# Patient Record
Sex: MALE | Race: BLACK OR AFRICAN AMERICAN | NOT HISPANIC OR LATINO | Employment: FULL TIME | URBAN - METROPOLITAN AREA
[De-identification: names, ages, dates, MRNs, and addresses within clinical notes are randomized per-mention and may not be internally consistent; named-entity substitution may affect disease eponyms.]

---

## 2024-11-20 ENCOUNTER — HOSPITAL ENCOUNTER (OUTPATIENT)
Facility: HOSPITAL | Age: 54
Discharge: HOME | End: 2024-11-22
Attending: EMERGENCY MEDICINE | Admitting: EMERGENCY MEDICINE
Payer: OTHER GOVERNMENT

## 2024-11-20 DIAGNOSIS — R07.9 CHEST PAIN: ICD-10-CM

## 2024-11-20 DIAGNOSIS — R79.89 TROPONIN I ABOVE REFERENCE RANGE: ICD-10-CM

## 2024-11-20 DIAGNOSIS — I50.42 CHRONIC COMBINED SYSTOLIC AND DIASTOLIC HEART FAILURE: Primary | ICD-10-CM

## 2024-11-20 DIAGNOSIS — R55 SYNCOPE: ICD-10-CM

## 2024-11-20 LAB
ALBUMIN SERPL BCP-MCNC: 4.1 G/DL (ref 3.5–5.2)
ALP SERPL-CCNC: 71 U/L (ref 40–150)
ALT SERPL W/O P-5'-P-CCNC: 113 U/L (ref 10–44)
ANION GAP SERPL CALC-SCNC: 17 MMOL/L (ref 8–16)
AST SERPL-CCNC: 145 U/L (ref 10–40)
BASOPHILS # BLD AUTO: 0.04 K/UL (ref 0–0.2)
BASOPHILS NFR BLD: 0.5 % (ref 0–1.9)
BILIRUB SERPL-MCNC: 1.3 MG/DL (ref 0.1–1)
BILIRUB UR QL STRIP: NEGATIVE
BNP SERPL-MCNC: 50 PG/ML (ref 0–99)
BUN SERPL-MCNC: 32 MG/DL (ref 6–20)
CALCIUM SERPL-MCNC: 9.5 MG/DL (ref 8.7–10.5)
CHLORIDE SERPL-SCNC: 87 MMOL/L (ref 95–110)
CK SERPL-CCNC: 352 U/L (ref 20–200)
CK SERPL-CCNC: 352 U/L (ref 20–200)
CLARITY UR REFRACT.AUTO: CLEAR
CO2 SERPL-SCNC: 26 MMOL/L (ref 23–29)
COLOR UR AUTO: YELLOW
CREAT SERPL-MCNC: 3 MG/DL (ref 0.5–1.4)
CREAT UR-MCNC: 109 MG/DL (ref 23–375)
D DIMER PPP IA.FEU-MCNC: 1.48 MG/L FEU
DIFFERENTIAL METHOD BLD: ABNORMAL
EOSINOPHIL # BLD AUTO: 0 K/UL (ref 0–0.5)
EOSINOPHIL NFR BLD: 0.1 % (ref 0–8)
ERYTHROCYTE [DISTWIDTH] IN BLOOD BY AUTOMATED COUNT: 16.2 % (ref 11.5–14.5)
EST. GFR  (NO RACE VARIABLE): 23.6 ML/MIN/1.73 M^2
GLUCOSE SERPL-MCNC: 88 MG/DL (ref 70–110)
GLUCOSE UR QL STRIP: NEGATIVE
HCT VFR BLD AUTO: 46.9 % (ref 40–54)
HGB BLD-MCNC: 16.6 G/DL (ref 14–18)
HGB UR QL STRIP: ABNORMAL
IMM GRANULOCYTES # BLD AUTO: 0.06 K/UL (ref 0–0.04)
IMM GRANULOCYTES NFR BLD AUTO: 0.8 % (ref 0–0.5)
INR PPP: 0.9 (ref 0.8–1.2)
KETONES UR QL STRIP: NEGATIVE
LEUKOCYTE ESTERASE UR QL STRIP: NEGATIVE
LYMPHOCYTES # BLD AUTO: 1.1 K/UL (ref 1–4.8)
LYMPHOCYTES NFR BLD: 14.5 % (ref 18–48)
MAGNESIUM SERPL-MCNC: 1.7 MG/DL (ref 1.6–2.6)
MCH RBC QN AUTO: 30.6 PG (ref 27–31)
MCHC RBC AUTO-ENTMCNC: 35.4 G/DL (ref 32–36)
MCV RBC AUTO: 86 FL (ref 82–98)
MONOCYTES # BLD AUTO: 1.1 K/UL (ref 0.3–1)
MONOCYTES NFR BLD: 15.6 % (ref 4–15)
NEUTROPHILS # BLD AUTO: 5 K/UL (ref 1.8–7.7)
NEUTROPHILS NFR BLD: 68.5 % (ref 38–73)
NITRITE UR QL STRIP: NEGATIVE
NRBC BLD-RTO: 0 /100 WBC
OHS QRS DURATION: 88 MS
OHS QTC CALCULATION: 460 MS
PH UR STRIP: 5 [PH] (ref 5–8)
PHOSPHATE SERPL-MCNC: 3.7 MG/DL (ref 2.7–4.5)
PLATELET # BLD AUTO: 148 K/UL (ref 150–450)
PMV BLD AUTO: 11.7 FL (ref 9.2–12.9)
POTASSIUM SERPL-SCNC: 3.8 MMOL/L (ref 3.5–5.1)
PROT SERPL-MCNC: 8.6 G/DL (ref 6–8.4)
PROT UR QL STRIP: NEGATIVE
PROTHROMBIN TIME: 10 SEC (ref 9–12.5)
RBC # BLD AUTO: 5.43 M/UL (ref 4.6–6.2)
SODIUM SERPL-SCNC: 130 MMOL/L (ref 136–145)
SODIUM UR-SCNC: 10 MMOL/L (ref 20–250)
SP GR UR STRIP: 1.01 (ref 1–1.03)
TROPONIN I SERPL DL<=0.01 NG/ML-MCNC: 0.1 NG/ML (ref 0–0.03)
TROPONIN I SERPL DL<=0.01 NG/ML-MCNC: 0.11 NG/ML (ref 0–0.03)
TROPONIN I SERPL DL<=0.01 NG/ML-MCNC: 0.11 NG/ML (ref 0–0.03)
TSH SERPL DL<=0.005 MIU/L-ACNC: 2.4 UIU/ML (ref 0.4–4)
URATE SERPL-MCNC: 7.3 MG/DL (ref 3.4–7)
URN SPEC COLLECT METH UR: ABNORMAL
WBC # BLD AUTO: 7.32 K/UL (ref 3.9–12.7)

## 2024-11-20 PROCEDURE — 84484 ASSAY OF TROPONIN QUANT: CPT | Mod: 91

## 2024-11-20 PROCEDURE — 83735 ASSAY OF MAGNESIUM: CPT | Performed by: EMERGENCY MEDICINE

## 2024-11-20 PROCEDURE — 82550 ASSAY OF CK (CPK): CPT | Performed by: EMERGENCY MEDICINE

## 2024-11-20 PROCEDURE — 80053 COMPREHEN METABOLIC PANEL: CPT | Performed by: EMERGENCY MEDICINE

## 2024-11-20 PROCEDURE — 84443 ASSAY THYROID STIM HORMONE: CPT | Performed by: EMERGENCY MEDICINE

## 2024-11-20 PROCEDURE — 85379 FIBRIN DEGRADATION QUANT: CPT | Performed by: EMERGENCY MEDICINE

## 2024-11-20 PROCEDURE — 80307 DRUG TEST PRSMV CHEM ANLYZR: CPT

## 2024-11-20 PROCEDURE — 84300 ASSAY OF URINE SODIUM: CPT

## 2024-11-20 PROCEDURE — 81003 URINALYSIS AUTO W/O SCOPE: CPT | Mod: 59

## 2024-11-20 PROCEDURE — 99285 EMERGENCY DEPT VISIT HI MDM: CPT | Mod: 25

## 2024-11-20 PROCEDURE — 84550 ASSAY OF BLOOD/URIC ACID: CPT | Performed by: EMERGENCY MEDICINE

## 2024-11-20 PROCEDURE — 85025 COMPLETE CBC W/AUTO DIFF WBC: CPT | Performed by: EMERGENCY MEDICINE

## 2024-11-20 PROCEDURE — 93010 ELECTROCARDIOGRAM REPORT: CPT | Mod: ,,, | Performed by: INTERNAL MEDICINE

## 2024-11-20 PROCEDURE — 93005 ELECTROCARDIOGRAM TRACING: CPT

## 2024-11-20 PROCEDURE — 85610 PROTHROMBIN TIME: CPT | Performed by: EMERGENCY MEDICINE

## 2024-11-20 PROCEDURE — 25000003 PHARM REV CODE 250

## 2024-11-20 PROCEDURE — 84484 ASSAY OF TROPONIN QUANT: CPT | Mod: 91 | Performed by: EMERGENCY MEDICINE

## 2024-11-20 PROCEDURE — 84100 ASSAY OF PHOSPHORUS: CPT | Performed by: EMERGENCY MEDICINE

## 2024-11-20 PROCEDURE — 83880 ASSAY OF NATRIURETIC PEPTIDE: CPT | Performed by: EMERGENCY MEDICINE

## 2024-11-20 PROCEDURE — 96361 HYDRATE IV INFUSION ADD-ON: CPT

## 2024-11-20 RX ADMIN — SODIUM CHLORIDE 1000 ML: 9 INJECTION, SOLUTION INTRAVENOUS at 09:11

## 2024-11-20 NOTE — FIRST PROVIDER EVALUATION
"Medical screening examination initiated.  I have conducted a focused provider triage encounter, findings are as follows:    Brief history of present illness:  55 yo male presenting with leg cramping since Monday night. Then noted dizziness and shakiness.     Vitals:    11/20/24 1643   BP: 118/89   BP Location: Right arm   Pulse: (!) 118   Resp: 20   Temp: 97.9 °F (36.6 °C)   TempSrc: Oral   SpO2: 96%   Weight: 78 kg (172 lb)   Height: 5' 7" (1.702 m)       Pertinent physical exam:  Tachycardic, tremulous, anxious appearing    Brief workup plan:  Labs, IVF as needed. Eval urgently, concern for withdrawal vs other metabolic cause    Preliminary workup initiated; this workup will be continued and followed by the physician or advanced practice provider that is assigned to the patient when roomed.  "

## 2024-11-21 PROBLEM — I83.009 VENOUS STASIS ULCER WITH EDEMA OF LOWER LEG: Status: ACTIVE | Noted: 2024-11-21

## 2024-11-21 PROBLEM — I83.899 VENOUS STASIS ULCER WITH EDEMA OF LOWER LEG: Status: ACTIVE | Noted: 2024-11-21

## 2024-11-21 PROBLEM — I10 HYPERTENSION: Status: ACTIVE | Noted: 2020-12-20

## 2024-11-21 PROBLEM — Z23 ENCOUNTER FOR IMMUNIZATION: Status: ACTIVE | Noted: 2024-11-21

## 2024-11-21 PROBLEM — R60.9 VENOUS STASIS ULCER WITH EDEMA OF LOWER LEG: Status: ACTIVE | Noted: 2024-11-21

## 2024-11-21 PROBLEM — L97.909 VENOUS STASIS ULCER WITH EDEMA OF LOWER LEG: Status: ACTIVE | Noted: 2024-11-21

## 2024-11-21 PROBLEM — R55 SYNCOPE: Status: ACTIVE | Noted: 2024-11-21

## 2024-11-21 PROBLEM — N17.9 AKI (ACUTE KIDNEY INJURY): Status: ACTIVE | Noted: 2024-11-21

## 2024-11-21 PROBLEM — E80.6 HYPERBILIRUBINEMIA: Status: ACTIVE | Noted: 2024-11-21

## 2024-11-21 PROBLEM — I50.42 CHRONIC COMBINED SYSTOLIC AND DIASTOLIC HEART FAILURE: Status: ACTIVE | Noted: 2020-11-01

## 2024-11-21 PROBLEM — D69.6 THROMBOCYTOPENIA: Status: ACTIVE | Noted: 2024-11-21

## 2024-11-21 LAB
ALBUMIN SERPL BCP-MCNC: 3.3 G/DL (ref 3.5–5.2)
ALP SERPL-CCNC: 62 U/L (ref 40–150)
ALT SERPL W/O P-5'-P-CCNC: 80 U/L (ref 10–44)
AMPHET+METHAMPHET UR QL: NEGATIVE
ANION GAP SERPL CALC-SCNC: 11 MMOL/L (ref 8–16)
ANION GAP SERPL CALC-SCNC: 14 MMOL/L (ref 8–16)
ASCENDING AORTA: 3.1 CM
AST SERPL-CCNC: 85 U/L (ref 10–40)
AV AREA BY CONTINUOUS VTI: 3.2 CM2
AV INDEX (PROSTH): 0.74
AV LVOT MEAN GRADIENT: 1 MMHG
AV LVOT PEAK GRADIENT: 2 MMHG
AV MEAN GRADIENT: 1.8 MMHG
AV PEAK GRADIENT: 3.2 MMHG
AV VALVE AREA BY VELOCITY RATIO: 3.2 CM²
AV VALVE AREA: 3.1 CM2
AV VELOCITY RATIO: 0.78
BARBITURATES UR QL SCN>200 NG/ML: NEGATIVE
BASOPHILS # BLD AUTO: 0.02 K/UL (ref 0–0.2)
BASOPHILS NFR BLD: 0.4 % (ref 0–1.9)
BENZODIAZ UR QL SCN>200 NG/ML: NEGATIVE
BILIRUB DIRECT SERPL-MCNC: 0.6 MG/DL (ref 0.1–0.3)
BILIRUB SERPL-MCNC: 1.5 MG/DL (ref 0.1–1)
BSA FOR ECHO PROCEDURE: 1.9 M2
BUN SERPL-MCNC: 27 MG/DL (ref 6–20)
BUN SERPL-MCNC: 31 MG/DL (ref 6–20)
BZE UR QL SCN: NEGATIVE
CALCIUM SERPL-MCNC: 8.4 MG/DL (ref 8.7–10.5)
CALCIUM SERPL-MCNC: 8.7 MG/DL (ref 8.7–10.5)
CANNABINOIDS UR QL SCN: NEGATIVE
CHLORIDE SERPL-SCNC: 96 MMOL/L (ref 95–110)
CHLORIDE SERPL-SCNC: 98 MMOL/L (ref 95–110)
CO2 SERPL-SCNC: 23 MMOL/L (ref 23–29)
CO2 SERPL-SCNC: 28 MMOL/L (ref 23–29)
CREAT SERPL-MCNC: 1.7 MG/DL (ref 0.5–1.4)
CREAT SERPL-MCNC: 1.9 MG/DL (ref 0.5–1.4)
CREAT UR-MCNC: 109 MG/DL (ref 23–375)
CV ECHO LV RWT: 0.44 CM
DIFFERENTIAL METHOD BLD: ABNORMAL
DOP CALC AO PEAK VEL: 0.9 M/S
DOP CALC AO VTI: 16.9 CM
DOP CALC LVOT AREA: 4.2 CM2
DOP CALC LVOT DIAMETER: 2.3 CM
DOP CALC LVOT PEAK VEL: 0.7 M/S
DOP CALC LVOT STROKE VOLUME: 51.9 CM3
DOP CALCLVOT PEAK VEL VTI: 12.5 CM
E WAVE DECELERATION TIME: 243.43 MS
E/A RATIO: 0.48
ECHO EF ESTIMATED: 54 %
ECHO LV POSTERIOR WALL: 1 CM (ref 0.6–1.1)
EOSINOPHIL # BLD AUTO: 0 K/UL (ref 0–0.5)
EOSINOPHIL NFR BLD: 0.2 % (ref 0–8)
ERYTHROCYTE [DISTWIDTH] IN BLOOD BY AUTOMATED COUNT: 16.4 % (ref 11.5–14.5)
EST. GFR  (NO RACE VARIABLE): 40.9 ML/MIN/1.73 M^2
EST. GFR  (NO RACE VARIABLE): 46.7 ML/MIN/1.73 M^2
ETHANOL SERPL-MCNC: <10 MG/DL
FRACTIONAL SHORTENING: 26.7 % (ref 28–44)
GLOBAL LONGITUIDAL STRAIN: 13 %
GLUCOSE SERPL-MCNC: 122 MG/DL (ref 70–110)
GLUCOSE SERPL-MCNC: 99 MG/DL (ref 70–110)
HAPTOGLOB SERPL-MCNC: 138 MG/DL (ref 30–250)
HAV IGM SERPL QL IA: NORMAL
HBV CORE IGM SERPL QL IA: NORMAL
HBV SURFACE AG SERPL QL IA: NORMAL
HCT VFR BLD AUTO: 42.5 % (ref 40–54)
HCV AB SERPL QL IA: NORMAL
HGB BLD-MCNC: 14.5 G/DL (ref 14–18)
HIV 1+2 AB+HIV1 P24 AG SERPL QL IA: NORMAL
IMM GRANULOCYTES # BLD AUTO: 0.04 K/UL (ref 0–0.04)
IMM GRANULOCYTES NFR BLD AUTO: 0.8 % (ref 0–0.5)
INTERVENTRICULAR SEPTUM: 0.9 CM (ref 0.6–1.1)
IVC DIAMETER: 1.21 CM
LA MAJOR: 5.37 CM
LA MINOR: 5.44 CM
LA WIDTH: 3.55 CM
LDH SERPL L TO P-CCNC: 247 U/L (ref 110–260)
LEFT ATRIUM SIZE: 3.58 CM
LEFT ATRIUM VOLUME INDEX MOD: 25.7 ML/M2
LEFT ATRIUM VOLUME INDEX: 31.1 ML/M2
LEFT ATRIUM VOLUME MOD: 48.3 ML
LEFT ATRIUM VOLUME: 58.39 CM3
LEFT INTERNAL DIMENSION IN SYSTOLE: 3.3 CM (ref 2.1–4)
LEFT VENTRICLE DIASTOLIC VOLUME INDEX: 49.28 ML/M2
LEFT VENTRICLE DIASTOLIC VOLUME: 92.65 ML
LEFT VENTRICLE MASS INDEX: 76 G/M2
LEFT VENTRICLE SYSTOLIC VOLUME INDEX: 22.6 ML/M2
LEFT VENTRICLE SYSTOLIC VOLUME: 42.53 ML
LEFT VENTRICULAR INTERNAL DIMENSION IN DIASTOLE: 4.5 CM (ref 3.5–6)
LEFT VENTRICULAR MASS: 142.9 G
LYMPHOCYTES # BLD AUTO: 0.6 K/UL (ref 1–4.8)
LYMPHOCYTES NFR BLD: 11.5 % (ref 18–48)
MAGNESIUM SERPL-MCNC: 2.1 MG/DL (ref 1.6–2.6)
MCH RBC QN AUTO: 30.5 PG (ref 27–31)
MCHC RBC AUTO-ENTMCNC: 34.1 G/DL (ref 32–36)
MCV RBC AUTO: 89 FL (ref 82–98)
METHADONE UR QL SCN>300 NG/ML: NEGATIVE
MONOCYTES # BLD AUTO: 1 K/UL (ref 0.3–1)
MONOCYTES NFR BLD: 20 % (ref 4–15)
MV PEAK A VEL: 0.63 M/S
MV PEAK E VEL: 0.3 M/S
NEUTROPHILS # BLD AUTO: 3.3 K/UL (ref 1.8–7.7)
NEUTROPHILS NFR BLD: 67.1 % (ref 38–73)
NRBC BLD-RTO: 0 /100 WBC
OHS CV RV/LV RATIO: 0.96 CM
OHS LV EJECTION FRACTION SIMPSONS BIPLANE MOD: 42 %
OHS QRS DURATION: 92 MS
OHS QTC CALCULATION: 433 MS
OPIATES UR QL SCN: NEGATIVE
PCP UR QL SCN>25 NG/ML: NEGATIVE
PHOSPHATE SERPL-MCNC: 2.8 MG/DL (ref 2.7–4.5)
PISA TR MAX VEL: 2.4 M/S
PLATELET # BLD AUTO: 128 K/UL (ref 150–450)
PMV BLD AUTO: 11.1 FL (ref 9.2–12.9)
POTASSIUM SERPL-SCNC: 3.4 MMOL/L (ref 3.5–5.1)
POTASSIUM SERPL-SCNC: 4.1 MMOL/L (ref 3.5–5.1)
PROT SERPL-MCNC: 6.7 G/DL (ref 6–8.4)
RA MAJOR: 4.53 CM
RA PRESSURE ESTIMATED: 3 MMHG
RA WIDTH: 3.52 CM
RBC # BLD AUTO: 4.76 M/UL (ref 4.6–6.2)
RIGHT ATRIAL AREA: 16.4 CM2
RIGHT VENTRICLE DIASTOLIC BASEL DIMENSION: 4.3 CM
RV TB RVSP: 5 MMHG
SINUS: 3.52 CM
SODIUM SERPL-SCNC: 133 MMOL/L (ref 136–145)
SODIUM SERPL-SCNC: 137 MMOL/L (ref 136–145)
STJ: 2.78 CM
TOXICOLOGY INFORMATION: NORMAL
TR MAX PG: 23 MMHG
TRICUSPID ANNULAR PLANE SYSTOLIC EXCURSION: 2.66 CM
TV PEAK GRADIENT: 23 MMHG
TV REST PULMONARY ARTERY PRESSURE: 26 MMHG
WBC # BLD AUTO: 4.85 K/UL (ref 3.9–12.7)
Z-SCORE OF LEFT VENTRICULAR DIMENSION IN END DIASTOLE: -1.54
Z-SCORE OF LEFT VENTRICULAR DIMENSION IN END SYSTOLE: 0.15

## 2024-11-21 PROCEDURE — 25000003 PHARM REV CODE 250

## 2024-11-21 PROCEDURE — 84100 ASSAY OF PHOSPHORUS: CPT

## 2024-11-21 PROCEDURE — 36415 COLL VENOUS BLD VENIPUNCTURE: CPT

## 2024-11-21 PROCEDURE — 82077 ASSAY SPEC XCP UR&BREATH IA: CPT

## 2024-11-21 PROCEDURE — G0378 HOSPITAL OBSERVATION PER HR: HCPCS

## 2024-11-21 PROCEDURE — 83735 ASSAY OF MAGNESIUM: CPT

## 2024-11-21 PROCEDURE — A9567 TECHNETIUM TC-99M AEROSOL: HCPCS | Performed by: HOSPITALIST

## 2024-11-21 PROCEDURE — A9540 TC99M MAA: HCPCS | Performed by: HOSPITALIST

## 2024-11-21 PROCEDURE — 96366 THER/PROPH/DIAG IV INF ADDON: CPT

## 2024-11-21 PROCEDURE — 85025 COMPLETE CBC W/AUTO DIFF WBC: CPT

## 2024-11-21 PROCEDURE — 87389 HIV-1 AG W/HIV-1&-2 AB AG IA: CPT

## 2024-11-21 PROCEDURE — 80074 ACUTE HEPATITIS PANEL: CPT

## 2024-11-21 PROCEDURE — 93010 ELECTROCARDIOGRAM REPORT: CPT | Mod: ,,, | Performed by: INTERNAL MEDICINE

## 2024-11-21 PROCEDURE — 82248 BILIRUBIN DIRECT: CPT

## 2024-11-21 PROCEDURE — 96365 THER/PROPH/DIAG IV INF INIT: CPT

## 2024-11-21 PROCEDURE — 83615 LACTATE (LD) (LDH) ENZYME: CPT

## 2024-11-21 PROCEDURE — 80048 BASIC METABOLIC PNL TOTAL CA: CPT | Mod: XB

## 2024-11-21 PROCEDURE — 80053 COMPREHEN METABOLIC PANEL: CPT

## 2024-11-21 PROCEDURE — 93005 ELECTROCARDIOGRAM TRACING: CPT

## 2024-11-21 PROCEDURE — 63600175 PHARM REV CODE 636 W HCPCS

## 2024-11-21 PROCEDURE — 83010 ASSAY OF HAPTOGLOBIN QUANT: CPT

## 2024-11-21 RX ORDER — MAGNESIUM SULFATE HEPTAHYDRATE 40 MG/ML
2 INJECTION, SOLUTION INTRAVENOUS ONCE
Status: COMPLETED | OUTPATIENT
Start: 2024-11-21 | End: 2024-11-21

## 2024-11-21 RX ORDER — SODIUM CHLORIDE 0.9 % (FLUSH) 0.9 %
10 SYRINGE (ML) INJECTION EVERY 12 HOURS PRN
Status: DISCONTINUED | OUTPATIENT
Start: 2024-11-21 | End: 2024-11-22 | Stop reason: HOSPADM

## 2024-11-21 RX ORDER — IBUPROFEN 200 MG
16 TABLET ORAL
Status: DISCONTINUED | OUTPATIENT
Start: 2024-11-21 | End: 2024-11-22 | Stop reason: HOSPADM

## 2024-11-21 RX ORDER — IBUPROFEN 200 MG
24 TABLET ORAL
Status: DISCONTINUED | OUTPATIENT
Start: 2024-11-21 | End: 2024-11-22 | Stop reason: HOSPADM

## 2024-11-21 RX ORDER — NALOXONE HCL 0.4 MG/ML
0.02 VIAL (ML) INJECTION
Status: DISCONTINUED | OUTPATIENT
Start: 2024-11-21 | End: 2024-11-22 | Stop reason: HOSPADM

## 2024-11-21 RX ORDER — DOCUSATE SODIUM 100 MG
300 CAPSULE ORAL
Status: COMPLETED | OUTPATIENT
Start: 2024-11-21 | End: 2024-11-21

## 2024-11-21 RX ORDER — GLUCAGON 1 MG
1 KIT INJECTION
Status: DISCONTINUED | OUTPATIENT
Start: 2024-11-21 | End: 2024-11-22 | Stop reason: HOSPADM

## 2024-11-21 RX ORDER — DOCUSATE SODIUM 100 MG
300 CAPSULE ORAL
Status: DISCONTINUED | OUTPATIENT
Start: 2024-11-21 | End: 2024-11-21

## 2024-11-21 RX ORDER — POTASSIUM CHLORIDE 750 MG/1
50 CAPSULE, EXTENDED RELEASE ORAL ONCE
Status: COMPLETED | OUTPATIENT
Start: 2024-11-21 | End: 2024-11-21

## 2024-11-21 RX ADMIN — POTASSIUM CHLORIDE 50 MEQ: 750 CAPSULE, EXTENDED RELEASE ORAL at 06:11

## 2024-11-21 RX ADMIN — Medication 300 ML: at 08:11

## 2024-11-21 RX ADMIN — KIT FOR THE PREPARATION OF TECHNETIUM TC 99M ALBUMIN AGGREGATED 5.4 MILLICURIE: 2.5 INJECTION, POWDER, FOR SOLUTION INTRAVENOUS at 10:11

## 2024-11-21 RX ADMIN — Medication 300 ML: at 01:11

## 2024-11-21 RX ADMIN — Medication 300 ML: at 05:11

## 2024-11-21 RX ADMIN — MAGNESIUM SULFATE IN WATER 2 G: 40 INJECTION, SOLUTION INTRAVENOUS at 06:11

## 2024-11-21 RX ADMIN — KIT FOR THE PREPARATION OF TECHNETIUM TC 99M PENTETATE 41 MILLICURIE: 20 INJECTION, POWDER, LYOPHILIZED, FOR SOLUTION INTRAVENOUS; RESPIRATORY (INHALATION) at 09:11

## 2024-11-21 NOTE — ED TRIAGE NOTES
Rich Verduzco, a 54 y.o. male presents to the ED w/ complaint of leg cramps and dizziness.  Patient endorses leg cramps that started Monday night on and off. Patient also stated yesterday he felt dizzy and lightheaded at work. Patient denies C/P,SOB and N/V/D,    Triage note:  Chief Complaint   Patient presents with    multiple complaints     Couldn't sleep Monday night.  Started having leg cramping Tuesday. Patient feels fatigue and dizziness patient. jittery in triage.     Review of patient's allergies indicates:  No Known Allergies  No past medical history on file.

## 2024-11-21 NOTE — ASSESSMENT & PLAN NOTE
YOUNG is likely due to  decompensated CHF . Baseline creatinine is unknown. Most recent creatinine and eGFR are listed below.  Recent Labs     11/20/24  1940 11/21/24  0358 11/21/24  1130   CREATININE 3.0* 1.9* 1.7*   EGFRNORACEVR 23.6* 40.9* 46.7*      Plan  - YOUNG is improving  - Avoid nephrotoxins and renally dose meds for GFR listed above  - Monitor urine output, serial BMP, and adjust therapy as needed

## 2024-11-21 NOTE — HPI
Mr. Figueroa is a 55 yo M with a PMHx of  who presented to [facility] who presents for further evaluation of syncope. Patient states that he started having muscle cramps in his legs and difficulty sleeping x 3 days. He states that he also has had decreased po intake despite exercising and going to work. 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 Patient reports he started to drink more gatorade and felt somewhat better but continued to have insomnia where he only slept a few hours. On Wednesday, he started feeling some lightheadedness at work so he drank a monster energy drink. Patient states when he bent over he lost consciousness and hit his elbow/back of his head. Patient reports he regained consciousness immediately and then came to the [facility]. Patient denies diaphoresis, cp, palpitations, n/v, sob, leg swelling, confusion, or any other complaints. States he previously was diagnosed with HTN 2 years ago but lost a lot of weight and no longer is on medication. Denies recent URI, early cardiac death in family, blood clot disorders in family, or known family arrythmias. Reports he drinks 3-4 days out of the week about 2-3 shots of liquor each time.      In the ED, vitals were significant for tachycardia. Initial EKG showed sinus tachycardia with rate 108. Labs showed Na 130, Cr 3.0, tbili 1.3, ast 145, alt 113, trop 0.106, bnp 50, tsh wnl, d-dimer 1.48, platelets 148, cpk 352, uric acid 7.3. Urine cr 109, urine sodium 10. CXR negative. CT abd/pelvis no major abnormalities, just nephric fat stranding. Patient was given fluids and potassium in the ED. Cardiology was consulted for global hypokinesis and EF 40% and question of disposition.

## 2024-11-21 NOTE — ASSESSMENT & PLAN NOTE
Platelet count 148. Could be secondary to alcohol use or other process such as HIV/hepatitis.    - f/u ethanol level

## 2024-11-21 NOTE — ED PROVIDER NOTES
Encounter Date: 11/20/2024       History     Chief Complaint   Patient presents with    multiple complaints     Couldn't sleep Monday night.  Started having leg cramping Tuesday. Patient feels fatigue and dizziness patient. jittery in triage.     Mr. Verduzco is a 54-year-old male with no PMH brought in to [facility] ED due to syncope. Patient states he had been having conciliation insomnia since Sunday night, right leg crampy pain, dizziness, fatigue, which continued for Monday. On Tuesday at night he was feeling better but still with insomnia. On wEdnesday he started feeling lightheadedness,a t work, took 2 redbulls and while patient was standing had loss of conciousness and postural tone. Hit his head and shoulder, but no vomiting, confusion or seizures afterwards. Patient recovered conciousness immediately.    Denies chest pain, palpitations, vomiting, diarrhea, sick contacts, upper respiratory symptoms, seizures, confusion, lower extremity swelling.         Review of patient's allergies indicates:  No Known Allergies  History reviewed. No pertinent past medical history.  History reviewed. No pertinent surgical history.  No family history on file.  Social History     Tobacco Use    Smoking status: Never    Smokeless tobacco: Never     Review of Systems    Physical Exam     Initial Vitals [11/20/24 1643]   BP Pulse Resp Temp SpO2   118/89 (!) 118 20 97.9 °F (36.6 °C) 96 %      MAP       --         Physical Exam    Constitutional: He appears well-developed and well-nourished.   HENT:   Head: Normocephalic and atraumatic.   Right Ear: External ear normal.   Left Ear: External ear normal.   Eyes: Conjunctivae and EOM are normal. Pupils are equal, round, and reactive to light.   Neck: Neck supple.   Normal range of motion.  Cardiovascular:  Normal rate and regular rhythm.           Pulmonary/Chest: Breath sounds normal.   Abdominal: Abdomen is soft. Bowel sounds are normal. There is no abdominal tenderness.    Musculoskeletal:         General: No tenderness or edema. Normal range of motion.      Cervical back: Normal range of motion and neck supple.     Neurological: He is alert and oriented to person, place, and time.   Skin: Skin is warm and dry. Capillary refill takes less than 2 seconds.   Psychiatric: He has a normal mood and affect. Thought content normal.         ED Course   Procedures  Labs Reviewed   CBC W/ AUTO DIFFERENTIAL - Abnormal       Result Value    WBC 7.32      RBC 5.43      Hemoglobin 16.6      Hematocrit 46.9      MCV 86      MCH 30.6      MCHC 35.4      RDW 16.2 (*)     Platelets 148 (*)     MPV 11.7      Immature Granulocytes 0.8 (*)     Gran # (ANC) 5.0      Immature Grans (Abs) 0.06 (*)     Lymph # 1.1      Mono # 1.1 (*)     Eos # 0.0      Baso # 0.04      nRBC 0      Gran % 68.5      Lymph % 14.5 (*)     Mono % 15.6 (*)     Eosinophil % 0.1      Basophil % 0.5      Differential Method Automated     COMPREHENSIVE METABOLIC PANEL   TROPONIN I   TROPONIN I   B-TYPE NATRIURETIC PEPTIDE   D DIMER, QUANTITATIVE   CK   TSH   PROTIME-INR   CK     EKG Readings: (Independently Interpreted)   Sinus tachychardia, axis normal, p mitrale, S1Q3T3     ECG Results              EKG 12-lead (Final result)        Collection Time Result Time QRS Duration [facility] QTC Calculation    11/20/24 16:46:03 11/20/24 17:34:01 88 460                     Final result by Interface, Lab In [facility] (11/20/24 17:34:07)                   Narrative:    Test Reason : R07.9,    Vent. Rate : 108 BPM     Atrial Rate : 108 BPM     P-R Int : 148 ms          QRS Dur :  88 ms      QT Int : 344 ms       P-R-T Axes :  48  61 -18 degrees    QTcB Int : 460 ms    Sinus tachycardia  Possible Left atrial enlargement  Abnormal QRS-T angle, consider primary T wave abnormality  Abnormal ECG  No previous ECGs available  Confirmed by John Easley ([id number]) on 11/20/2024 5:33:56 PM    Referred By:            Confirmed By: John Easley                                   Imaging Results    None          Medications - No data to display  Medical Decision Making  54-year-old male with no PMH admitted due to syncope after standing up and drinking two red bulls. My DDX includes but it is not limited to Orthostatic syncope, cardiogenic syncope due to arrythmias, MI, PE, seizures. CBC, CMP, ordered. Troponins were mildly elevated 0.010 but without delta. D-Dimer ordered. Monitor on telemetry. CTAP   Results: CBC, unremarkable. CMP remarkable for YOUNG Cr 3.  Tbili 1.3  . .     Pending VQ scan and CTAP results.    Amount and/or Complexity of Data Reviewed  Labs: ordered.  Radiology: ordered.                                      Clinical Impression:  Final diagnoses:  [R07.9] Chest pain                 Brenton Vick MD  Resident  11/21/24 0105       Brenton Vick MD  Resident  11/21/24 0217       Brenton Vick MD  Resident  11/21/24 1854

## 2024-11-21 NOTE — ED NOTES
Bed: [facility]  Expected date:   Expected time:   Means of arrival:   Comments:  Adrian

## 2024-11-21 NOTE — HPI
Mr. Figueroa is a 55 yo M with no PMHx who presented to [facility] who presents for further evaluation of syncope. Patient states that he started having muscle cramps in his legs and difficulty sleeping x 3 days. He states that he also has had decreased po intake despite exercising and going to work. 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 Patient reports he started to drink more gatorade and felt somewhat better but continued to have insomnia where he only slept a few hours. On Wednesday, he started feeling some lightheadedness at work so he drank a monster energy drink. Patient states when he bent over he lost consciousness and hit his elbow/back of his head. Patient reports he regained consciousness immediately and then came to the [facility]. Patient denies diaphoresis, cp, palpitations, n/v, sob, leg swelling, confusion, or any other complaints. States he previously was diagnosed with HTN 2 years ago but lost a lot of weight and no longer is on medication. Denies recent URI, early cardiac death in family, blood clot disorders in family, or known family arrythmias. Reports he drinks 3-4 days out of the week about 2-3 shots of liquor each time.     In the ED, vitals were significant for tachycardia. Initial EKG showed sinus tachycardia with rate 108. Labs showed Na 130, Cr 3.0, tbili 1.3, ast 145, alt 113, trop 0.106, bnp 50, tsh wnl, d-dimer 1.48, platelets 148, cpk 352, uric acid 7.3. Urine cr 109, urine sodium 10. CXR negative. CT abd/pelvis no major abnormalities, just nephric fat stranding. Patient was given fluids and potassium in the ED.     Patient admitted to hospital medicine for further workup of syncope and YOUNG

## 2024-11-21 NOTE — PLAN OF CARE
Problem: Adult Inpatient Plan of Care  Goal: Plan of Care Review  Flowsheets (Taken 11/21/2024 0753)  Plan of Care Reviewed With: patient  Goal: Patient-Specific Goal (Individualized)  Flowsheets (Taken 11/21/2024 0753)  Individualized Care Needs: monitor vitals  Anxieties, Fears or Concerns: none  Patient/Family-Specific Goals (Include Timeframe): sleep/ nap more 11/21  Goal: Absence of Hospital-Acquired Illness or Injury  Reactivated  Intervention: Identify and Manage Fall Risk  Flowsheets (Taken 11/21/2024 0753)  Safety Promotion/Fall Prevention:   assistive device/personal item within reach   lighting adjusted   nonskid shoes/socks when out of bed  Intervention: Prevent Skin Injury  Flowsheets (Taken 11/21/2024 0753)  Body Position: position changed independently  Intervention: Prevent and Manage VTE (Venous Thromboembolism) Risk  Flowsheets (Taken 11/21/2024 0753)  VTE Prevention/Management:   ambulation promoted   fluids promoted  Intervention: Prevent Infection  Flowsheets (Taken 11/21/2024 0753)  Infection Prevention:   hand hygiene promoted   personal protective equipment utilized   rest/sleep promoted   single patient room provided  Goal: Optimal Comfort and Wellbeing  Reactivated  Intervention: Provide Person-Centered Care  Flowsheets (Taken 11/21/2024 0753)  Trust Relationship/Rapport:   care explained   choices provided   emotional support provided   questions answered   questions encouraged   reassurance provided   thoughts/feelings acknowledged   empathic listening provided  Goal: Readiness for Transition of Care  Reactivated     Problem: Acute Kidney Injury/Impairment  Goal: Fluid and Electrolyte Balance  Reactivated  Goal: Improved Oral Intake  Reactivated  Goal: Effective Renal Function  Reactivated

## 2024-11-21 NOTE — PLAN OF CARE
[facility] scheduled pcp f/u   Future Appointments   Date Time Provider Department Center   11/27/2024  2:30 PM Raven Dai PA-C [facility]  [facility]

## 2024-11-21 NOTE — ASSESSMENT & PLAN NOTE
Total Bilirubin elevated to 1.3. Could be dehydration related but other lfts also elevated. Reports alcohol use.     - hepatitis panel, hiv  - direct bili

## 2024-11-21 NOTE — PLAN OF CARE
"[facility] - Internal Medicine Telemetry  Initial Discharge Assessment       Primary Care Provider: No primary care provider on file.    Admission Diagnosis: Syncope [R55]  Chest pain [R07.9]    Admission Date: 11/20/2024  Expected Discharge Date: 11/22/2024    Transition of Care Barriers: (P) None    Payor: [employer] / Plan: [facility] OPTUM / Product Type: Government /     Extended Emergency Contact Information  Primary Emergency Contact: Hilary Verduzco  Mobile Phone: [phone]  Relation: Mother    Discharge Plan A: (P) Home with family  Discharge Plan B: (P) Home    No Pharmacies Listed    Initial Assessment (most recent)       Adult Discharge Assessment - 11/21/24 1409          Discharge Assessment    Assessment Type Discharge Planning Assessment     Confirmed/corrected address, phone number and insurance Yes     Confirmed Demographics Correct on Facesheet     Source of Information patient     When was your last doctors appointment? -- (P)    "Last year"    Communicated STEPHAN with patient/caregiver Yes     People in Home parent(s)     Do you expect to return to your current living situation? Yes (P)      Do you have help at home or someone to help you manage your care at home? Yes (P)      Who are your caregiver(s) and their phone number(s)? Hilary Verduzco (Mother)  [phone] (P)      Prior to hospitilization cognitive status: Alert/Oriented (P)      Current cognitive status: Alert/Oriented (P)      Walking or Climbing Stairs Difficulty no (P)      Dressing/Bathing Difficulty no (P)      Home Accessibility wheelchair accessible;stairs to enter home (P)      Number of Stairs, Main Entrance ten (P)      Home Layout Able to live on 1st floor (P)      Equipment Currently Used at Home none (P)      Readmission within 30 days? No (P)      Patient currently being followed by outpatient case management? No (P)      Do you currently have service(s) that help you manage your care at home? No (P)      Do you take " prescription medications? Yes (P)      Do you have prescription coverage? Yes (P)      Coverage [facility] [employer] [facility] [place] [employer] - (P)      Do you have any problems affording any of your prescribed medications? No (P)      Is the patient taking medications as prescribed? yes (P)      Who is going to help you get home at discharge? Hilary Verduzco (Mother)  [phone] (P)      How do you get to doctors appointments? car, drives self (P)      Are you on dialysis? No (P)      Do you take coumadin? No (P)      Discharge Plan A Home with family (P)      Discharge Plan B Home (P)      DME Needed Upon Discharge  none (P)      Discharge Plan discussed with: Patient (P)      Transition of Care Barriers None (P)         Physical Activity    On average, how many days per week do you engage in moderate to strenuous exercise (like a brisk walk)? 7 days (P)      On average, how many minutes do you engage in exercise at this level? 60 min (P)         Financial Resource Strain    How hard is it for you to pay for the very basics like food, housing, medical care, and heating? Not hard at all (P)         Housing Stability    In the last 12 months, was there a time when you were not able to pay the mortgage or rent on time? No (P)      At any time in the past 12 months, were you homeless or living in a shelter (including now)? No (P)         Transportation Needs    Has the lack of transportation kept you from medical appointments, meetings, work or from getting things needed for daily living? No (P)         Food Insecurity    Within the past 12 months, you worried that your food would run out before you got the money to buy more. Never true (P)      Within the past 12 months, the food you bought just didn't last and you didn't have money to get more. Never true (P)         Stress    Do you feel stress - tense, restless, nervous, or anxious, or unable to sleep at night because your mind is troubled all the time - these days?  Rather much (P)         Social Isolation    How often do you feel lonely or isolated from those around you?  Never (P)         Alcohol Use    Q1: How often do you have a drink containing alcohol? 2-3 times a week (P)      Q2: How many drinks containing alcohol do you have on a typical day when you are drinking? 3 or 4 (P)      Q3: How often do you have six or more drinks on one occasion? Less than monthly (P)         Utilities    In the past 12 months has the electric, gas, oil, or water company threatened to shut off services in your home? No (P)         Health Literacy    How often do you need to have someone help you when you read instructions, pamphlets, or other written material from your doctor or pharmacy? Never (P)         OTHER    Name(s) of People in Home Hilary Verduzco (Mother)  [phone] and Joe (P)                  Discharge Plan A and Plan B have been determined by review of patient's clinical status, future medical and therapeutic needs, and coverage/benefits for post-acute care in coordination with multidisciplinary team members.               RUSTY Bourne, LMSW  [facility]  Case Management  Ext. [phone]

## 2024-11-21 NOTE — ASSESSMENT & PLAN NOTE
Postural syncope vs cardiogenic syncope. Did have initial trop elevation. May have been due to dehydration causing tachycardia. Lack of prodromal symptoms concerning for more cardiac. EKG sinus tachycardia. Denies family history of early cardiac death or arrythmias. Per chart review in care everywhere there was mention of HF but unsure of accuracy. Elevated d-dimer. Negative CT abd/pelvis.     - cardiac tele  - orthostatics  - f/u V/Q scan  - can consider echo  - may need monitoring device on discharge  - f/u cmp, cbc, mg, phos

## 2024-11-21 NOTE — ASSESSMENT & PLAN NOTE
YOUNG is likely due to pre-renal azotemia due to dehydration. Baseline creatinine is unknown. Most recent creatinine and eGFR are listed below.  Recent Labs     11/20/24  1940 11/21/24  0358   CREATININE 3.0* 1.9*   EGFRNORACEVR 23.6* 40.9*      Plan  - YOUNG is improving  - Avoid nephrotoxins and renally dose meds for GFR listed above  - Monitor urine output, serial BMP, and adjust therapy as needed  - FeUrea pre renal  - downtrending Cr with fluids  - continue pedialyte 300ml every 4 hours for 4 doses

## 2024-11-21 NOTE — H&P
Nick Garces - Internal Medicine [facility] Medicine  History & Physical    Patient Name: Rich Verduzco  MRN: [id number]  Patient Class: OP- Observation  Admission Date: 11/20/2024  Attending Physician: Ruben Fuentes MD   Primary Care Provider: No primary care provider on file.         Patient information was obtained from patient, past medical records, and ER records.     Subjective:     Principal Problem:Syncope    Chief Complaint:   Chief Complaint   Patient presents with    multiple complaints     Couldn't sleep Monday night.  Started having leg cramping Tuesday. Patient feels fatigue and dizziness patient. jittery in triage.        HPI: Mr. Figueroa is a 53 yo M with no PMHx who presented to [facility] who presents for further evaluation of syncope. Patient states that he started having muscle cramps in his legs and difficulty sleeping x 3 days. He states that he also has had decreased po intake despite exercising and going to work. 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 Patient reports he started to drink more gatorade and felt somewhat better but continued to have insomnia where he only slept a few hours. On Wednesday, he started feeling some lightheadedness at work so he drank a monster energy drink. Patient states when he bent over he lost consciousness and hit his elbow/back of his head. Patient reports he regained consciousness immediately and then came to the [facility]. Patient denies diaphoresis, cp, palpitations, n/v, sob, leg swelling, confusion, or any other complaints. States he previously was diagnosed with HTN 2 years ago but lost a lot of weight and no longer is on medication. Denies recent URI, early cardiac death in family, blood clot disorders in family, or known family arrythmias. Reports he drinks 3-4 days out of the week about 2-3 shots of liquor each time.     In the ED, vitals were significant for tachycardia. Initial EKG showed sinus tachycardia with rate 108. Labs showed Na 130,  Cr 3.0, tbili 1.3, ast 145, alt 113, trop 0.106, bnp 50, tsh wnl, d-dimer 1.48, platelets 148, cpk 352, uric acid 7.3. Urine cr 109, urine sodium 10. CXR negative. CT abd/pelvis no major abnormalities, just nephric fat stranding. Patient was given fluids and potassium in the ED.     Patient admitted to hospital medicine for further workup of syncope and YOUNG    History reviewed. No pertinent past medical history.    History reviewed. No pertinent surgical history.    Review of patient's allergies indicates:  No Known Allergies    No current facility-administered medications on file prior to encounter.     No current outpatient medications on file prior to encounter.     Family History    None       Tobacco Use    Smoking status: Never    Smokeless tobacco: Never   Substance and Sexual Activity    Alcohol use: Not on file    Drug use: Not on file    Sexual activity: Not on file     Review of Systems   Constitutional:  Positive for appetite change and fatigue. Negative for chills, diaphoresis and fever.   HENT:  Negative for congestion and sore throat.    Eyes:  Negative for visual disturbance.   Respiratory:  Negative for shortness of breath.    Cardiovascular:  Negative for chest pain, palpitations and leg swelling.   Gastrointestinal:  Negative for nausea and vomiting.   Genitourinary:  Negative for dysuria.   Musculoskeletal:  Positive for myalgias.   Neurological:  Positive for dizziness, syncope and weakness.   Psychiatric/Behavioral:  Positive for sleep disturbance. Negative for confusion.      Objective:     Vital Signs (Most Recent):  Temp: 98.4 °F (36.9 °C) (11/21/24 0519)  Pulse: 110 (11/21/24 0519)  Resp: 18 (11/21/24 0519)  BP: 117/76 (11/21/24 0519)  SpO2: 98 % (11/21/24 0519) Vital Signs (24h Range):  Temp:  [97 °F (36.1 °C)-98.4 °F (36.9 °C)] 98.4 °F (36.9 °C)  Pulse:  [] 110  Resp:  [13-20] 18  SpO2:  [96 %-100 %] 98 %  BP: (111-135)/(57-89) 117/76     Weight: 76.7 kg (169 lb 1.5 oz)  Body mass  index is 26.48 kg/m².     Physical Exam  Vitals reviewed.   Constitutional:       General: He is not in acute distress.     Appearance: Normal appearance.   HENT:      Head: Normocephalic and atraumatic.      Right Ear: External ear normal.      Left Ear: External ear normal.      Nose: Nose normal.      Mouth/Throat:      Mouth: Mucous membranes are dry.   Eyes:      Extraocular Movements: Extraocular movements intact.      Conjunctiva/sclera: Conjunctivae normal.   Cardiovascular:      Rate and Rhythm: Normal rate and regular rhythm.   Pulmonary:      Effort: Pulmonary effort is normal. No respiratory distress.      Breath sounds: Normal breath sounds.   Abdominal:      General: Abdomen is flat. Bowel sounds are normal.      Palpations: Abdomen is soft.      Tenderness: There is no abdominal tenderness.   Musculoskeletal:         General: Normal range of motion.      Cervical back: Normal range of motion.      Right lower leg: No edema.      Left lower leg: No edema.   Skin:     General: Skin is warm and dry.   Neurological:      General: No focal deficit present.      Mental Status: He is alert and oriented to person, place, and time.   Psychiatric:         Mood and Affect: Mood normal.         Behavior: Behavior normal.                Significant Labs: All pertinent labs within the past 24 hours have been reviewed.    Significant Imaging: I have reviewed all pertinent imaging results/findings within the past 24 hours.  Assessment/Plan:     * Syncope  Postural syncope vs cardiogenic syncope. Did have initial trop elevation. May have been due to dehydration causing tachycardia. Lack of prodromal symptoms concerning for more cardiac. EKG sinus tachycardia. Denies family history of early cardiac death or arrythmias. Per chart review in care everywhere there was mention of HF but unsure of accuracy. Elevated d-dimer. Negative CT abd/pelvis.     - cardiac tele  - orthostatics  - f/u V/Q scan  - can consider echo  -  may need monitoring device on discharge  - f/u cmp, cbc, mg, phos      YOUNG (acute kidney injury)  YOUNG is likely due to pre-renal azotemia due to dehydration. Baseline creatinine is unknown. Most recent creatinine and eGFR are listed below.  Recent Labs     11/20/24 1940 11/21/24  0358   CREATININE 3.0* 1.9*   EGFRNORACEVR 23.6* 40.9*      Plan  - YOUNG is improving  - Avoid nephrotoxins and renally dose meds for GFR listed above  - Monitor urine output, serial BMP, and adjust therapy as needed  - FeUrea pre renal  - downtrending Cr with fluids  - continue pedialyte 300ml every 4 hours for 4 doses      Hyperbilirubinemia  Total Bilirubin elevated to 1.3. Could be dehydration related but other lfts also elevated. Reports alcohol use.     - hepatitis panel, hiv  - direct bili      Thrombocytopenia  Platelet count 148. Could be secondary to alcohol use or other process such as HIV/hepatitis.    - f/u ethanol level        VTE Risk Mitigation (From admission, onward)           Ordered     IP VTE LOW RISK PATIENT  Once         11/21/24 0454     Place sequential compression device  Until discontinued         11/21/24 0454                           Herson Cano MD  Department of Hospital Medicine  Nick Garces - Internal Medicine Telemetry

## 2024-11-21 NOTE — SUBJECTIVE & OBJECTIVE
History reviewed. No pertinent past medical history.    History reviewed. No pertinent surgical history.    Review of patient's allergies indicates:  No Known Allergies    No current facility-administered medications on file prior to encounter.     No current outpatient medications on file prior to encounter.     Family History    None       Tobacco Use    Smoking status: Never    Smokeless tobacco: Never   Substance and Sexual Activity    Alcohol use: Not on file    Drug use: Not on file    Sexual activity: Not on file     Review of Systems   Constitutional:  Positive for appetite change and fatigue. Negative for chills, diaphoresis and fever.   HENT:  Negative for congestion and sore throat.    Eyes:  Negative for visual disturbance.   Respiratory:  Negative for shortness of breath.    Cardiovascular:  Negative for chest pain, palpitations and leg swelling.   Gastrointestinal:  Negative for nausea and vomiting.   Genitourinary:  Negative for dysuria.   Musculoskeletal:  Positive for myalgias.   Neurological:  Positive for dizziness, syncope and weakness.   Psychiatric/Behavioral:  Positive for sleep disturbance. Negative for confusion.      Objective:     Vital Signs (Most Recent):  Temp: 98.4 °F (36.9 °C) (11/21/24 0519)  Pulse: 110 (11/21/24 0519)  Resp: 18 (11/21/24 0519)  BP: 117/76 (11/21/24 0519)  SpO2: 98 % (11/21/24 0519) Vital Signs (24h Range):  Temp:  [97 °F (36.1 °C)-98.4 °F (36.9 °C)] 98.4 °F (36.9 °C)  Pulse:  [] 110  Resp:  [13-20] 18  SpO2:  [96 %-100 %] 98 %  BP: (111-135)/(57-89) 117/76     Weight: 76.7 kg (169 lb 1.5 oz)  Body mass index is 26.48 kg/m².     Physical Exam  Vitals reviewed.   Constitutional:       General: He is not in acute distress.     Appearance: Normal appearance.   HENT:      Head: Normocephalic and atraumatic.      Right Ear: External ear normal.      Left Ear: External ear normal.      Nose: Nose normal.      Mouth/Throat:      Mouth: Mucous membranes are dry.    Eyes:      Extraocular Movements: Extraocular movements intact.      Conjunctiva/sclera: Conjunctivae normal.   Cardiovascular:      Rate and Rhythm: Normal rate and regular rhythm.   Pulmonary:      Effort: Pulmonary effort is normal. No respiratory distress.      Breath sounds: Normal breath sounds.   Abdominal:      General: Abdomen is flat. Bowel sounds are normal.      Palpations: Abdomen is soft.      Tenderness: There is no abdominal tenderness.   Musculoskeletal:         General: Normal range of motion.      Cervical back: Normal range of motion.      Right lower leg: No edema.      Left lower leg: No edema.   Skin:     General: Skin is warm and dry.   Neurological:      General: No focal deficit present.      Mental Status: He is alert and oriented to person, place, and time.   Psychiatric:         Mood and Affect: Mood normal.         Behavior: Behavior normal.                Significant Labs: All pertinent labs within the past 24 hours have been reviewed.    Significant Imaging: I have reviewed all pertinent imaging results/findings within the past 24 hours.

## 2024-11-22 VITALS
BODY MASS INDEX: 26.53 KG/M2 | TEMPERATURE: 98 F | HEIGHT: 67 IN | RESPIRATION RATE: 18 BRPM | SYSTOLIC BLOOD PRESSURE: 136 MMHG | HEART RATE: 83 BPM | WEIGHT: 169 LBS | OXYGEN SATURATION: 98 % | DIASTOLIC BLOOD PRESSURE: 91 MMHG

## 2024-11-22 LAB
ALBUMIN SERPL BCP-MCNC: 2.8 G/DL (ref 3.5–5.2)
ALP SERPL-CCNC: 66 U/L (ref 40–150)
ALT SERPL W/O P-5'-P-CCNC: 59 U/L (ref 10–44)
ANION GAP SERPL CALC-SCNC: 9 MMOL/L (ref 8–16)
AST SERPL-CCNC: 47 U/L (ref 10–40)
BASOPHILS # BLD AUTO: 0.02 K/UL (ref 0–0.2)
BASOPHILS NFR BLD: 0.5 % (ref 0–1.9)
BILIRUB SERPL-MCNC: 1.3 MG/DL (ref 0.1–1)
BUN SERPL-MCNC: 20 MG/DL (ref 6–20)
CALCIUM SERPL-MCNC: 8.3 MG/DL (ref 8.7–10.5)
CHLORIDE SERPL-SCNC: 100 MMOL/L (ref 95–110)
CO2 SERPL-SCNC: 28 MMOL/L (ref 23–29)
CREAT SERPL-MCNC: 1.1 MG/DL (ref 0.5–1.4)
DIFFERENTIAL METHOD BLD: ABNORMAL
EOSINOPHIL # BLD AUTO: 0 K/UL (ref 0–0.5)
EOSINOPHIL NFR BLD: 0.5 % (ref 0–8)
ERYTHROCYTE [DISTWIDTH] IN BLOOD BY AUTOMATED COUNT: 16.5 % (ref 11.5–14.5)
EST. GFR  (NO RACE VARIABLE): >60 ML/MIN/1.73 M^2
GLUCOSE SERPL-MCNC: 101 MG/DL (ref 70–110)
HCT VFR BLD AUTO: 37.5 % (ref 40–54)
HGB BLD-MCNC: 12.6 G/DL (ref 14–18)
IMM GRANULOCYTES # BLD AUTO: 0.02 K/UL (ref 0–0.04)
IMM GRANULOCYTES NFR BLD AUTO: 0.5 % (ref 0–0.5)
LYMPHOCYTES # BLD AUTO: 0.7 K/UL (ref 1–4.8)
LYMPHOCYTES NFR BLD: 17.2 % (ref 18–48)
MAGNESIUM SERPL-MCNC: 2.2 MG/DL (ref 1.6–2.6)
MCH RBC QN AUTO: 30 PG (ref 27–31)
MCHC RBC AUTO-ENTMCNC: 33.6 G/DL (ref 32–36)
MCV RBC AUTO: 89 FL (ref 82–98)
MONOCYTES # BLD AUTO: 0.9 K/UL (ref 0.3–1)
MONOCYTES NFR BLD: 23.8 % (ref 4–15)
NEUTROPHILS # BLD AUTO: 2.2 K/UL (ref 1.8–7.7)
NEUTROPHILS NFR BLD: 57.5 % (ref 38–73)
NRBC BLD-RTO: 0 /100 WBC
PHOSPHATE SERPL-MCNC: 2.6 MG/DL (ref 2.7–4.5)
PLATELET # BLD AUTO: 117 K/UL (ref 150–450)
PMV BLD AUTO: 11.4 FL (ref 9.2–12.9)
POTASSIUM SERPL-SCNC: 3.6 MMOL/L (ref 3.5–5.1)
PROT SERPL-MCNC: 5.8 G/DL (ref 6–8.4)
RBC # BLD AUTO: 4.2 M/UL (ref 4.6–6.2)
SODIUM SERPL-SCNC: 137 MMOL/L (ref 136–145)
WBC # BLD AUTO: 3.78 K/UL (ref 3.9–12.7)

## 2024-11-22 PROCEDURE — 83735 ASSAY OF MAGNESIUM: CPT

## 2024-11-22 PROCEDURE — 84100 ASSAY OF PHOSPHORUS: CPT

## 2024-11-22 PROCEDURE — G0378 HOSPITAL OBSERVATION PER HR: HCPCS

## 2024-11-22 PROCEDURE — 80053 COMPREHEN METABOLIC PANEL: CPT

## 2024-11-22 PROCEDURE — 36415 COLL VENOUS BLD VENIPUNCTURE: CPT

## 2024-11-22 PROCEDURE — 85025 COMPLETE CBC W/AUTO DIFF WBC: CPT

## 2024-11-22 RX ORDER — ASPIRIN 325 MG
325 TABLET, DELAYED RELEASE (ENTERIC COATED) ORAL
Status: ON HOLD | COMMUNITY
End: 2024-11-22 | Stop reason: HOSPADM

## 2024-11-22 NOTE — DISCHARGE SUMMARY
Nick Garces - Internal Medicine [facility] Medicine  Discharge Summary      Patient Name: Rich Verduzco  MRN: [id number]  [facility]: [id number]  Patient Class: OP- Observation  Admission Date: 11/20/2024  Hospital Length of Stay: 0 days  Discharge Date and Time:  11/22/2024 2:09 PM  Attending Physician: Ruben Fuentes MD   Discharging Provider: Chadwick Rainey MD  Primary Care Provider: No primary care provider on file.  Hospital Medicine Team: [facility] Chadwick Rainey MD  Primary Care Team: [facility]    HPI:   Mr. Figueroa is a 53 yo M with no PMHx who presented to [facility] who presents for further evaluation of syncope. Patient states that he started having muscle cramps in his legs and difficulty sleeping x 3 days. He states that he also has had decreased po intake despite exercising and going to work. 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 Patient reports he started to drink more gatorade and felt somewhat better but continued to have insomnia where he only slept a few hours. On Wednesday, he started feeling some lightheadedness at work so he drank a monster energy drink. Patient states when he bent over he lost consciousness and hit his elbow/back of his head. Patient reports he regained consciousness immediately and then came to the [facility]. Patient denies diaphoresis, cp, palpitations, n/v, sob, leg swelling, confusion, or any other complaints. States he previously was diagnosed with HTN 2 years ago but lost a lot of weight and no longer is on medication. Denies recent URI, early cardiac death in family, blood clot disorders in family, or known family arrythmias. Reports he drinks 3-4 days out of the week about 2-3 shots of liquor each time.     In the ED, vitals were significant for tachycardia. Initial EKG showed sinus tachycardia with rate 108. Labs showed Na 130, Cr 3.0, tbili 1.3, ast 145, alt 113, trop 0.106, bnp 50, tsh wnl, d-dimer 1.48, platelets 148, cpk 352, uric acid 7.3. Urine  cr 109, urine sodium 10. CXR negative. CT abd/pelvis no major abnormalities, just nephric fat stranding. Patient was given fluids and potassium in the ED.     Patient admitted to hospital medicine for further workup of syncope and YOUNG    * No surgery found *      Hospital Course:   54M no significant PMHx here for evaluation of syncopal event in setting of poor PO intake. Patient also with active EtOH use at risk for AUD. In ED patient tachycardic, hyponatremic to 130, YOUNG with Cr 3.0 (unclear baseline), elevated transaminases with TBili 1.3, AST//113, flat troponinemia to peak .113, mild elevation , and CT A/P with c/f perinephric stranding. An elevated d-dimer triggered a V/Q scan which was low probability PE. ECG does raise c/f ischemic changes at V1-V2. An ECHO shows moderately reduced EF with global hypokinesis. Patient with documented hx of combined HF. Cardiology was contacted and did not have strong concern for the global hypokinesis we noted here, with recommendation to initiate GDMT as tolerated. Patient without symptoms and extensive aerobic and anaerobic exercising. Renal function improved with fluid resuscitation. [facility] and [facility].      Goals of Care Treatment Preferences:  Code Status: Full Code    Physical Exam  Vitals and nursing note reviewed.   Constitutional:       Appearance: Normal appearance.   HENT:      Head: Normocephalic and atraumatic.      Right Ear: External ear normal.      Left Ear: External ear normal.      Mouth/Throat:      Mouth: Mucous membranes are moist.   Eyes:      Pupils: Pupils are equal, round, and reactive to light.   Cardiovascular:      Rate and Rhythm: Normal rate and regular rhythm.      Pulses: Normal pulses.      Heart sounds: Normal heart sounds.   Pulmonary:      Effort: Pulmonary effort is normal.      Breath sounds: Normal breath sounds.   Abdominal:      General: Abdomen is flat.      Palpations:  Abdomen is soft.   Musculoskeletal:         General: No tenderness. Normal range of motion.      Right lower leg: No edema.      Left lower leg: No edema.   Skin:     General: Skin is warm.   Neurological:      General: No focal deficit present.      Mental Status: He is alert and oriented to person, place, and time.   Psychiatric:         Mood and Affect: Mood normal.         Behavior: Behavior normal.         Thought Content: Thought content normal.         Judgment: Judgment normal.           SDOH Screening:  The patient was screened for utility difficulties, food insecurity, transport difficulties, housing insecurity, and interpersonal safety and there were no concerns identified this admission.     Consults:     No new Assessment & Plan notes have been filed under this hospital service since the last note was generated.  Service: Hospital Medicine    Final Active Diagnoses:    Diagnosis Date Noted POA    PRINCIPAL PROBLEM:  Syncope [R55] 11/21/2024 Yes    Thrombocytopenia [D69.6] 11/21/2024 Yes    Hyperbilirubinemia [E80.6] 11/21/2024 Yes    YOUNG (acute kidney injury) [N17.9] 11/21/2024 Yes    Hypertension [I10] 12/20/2020 Yes    Chronic combined systolic and diastolic heart failure [I50.42] 11/01/2020 Yes      Problems Resolved During this Admission:       Discharged Condition: good    Disposition: Home or Self Care    Follow Up:    Patient Instructions:      Ambulatory referral/consult to Cardiology   Standing Status: Future   Referral Priority: Routine Referral Type: Consultation   Referral Reason: Specialty Services Required   Requested Specialty: Cardiology   Number of Visits Requested: 1     Ambulatory referral/consult to Cardiology   Standing Status: Future   Referral Priority: Routine Referral Type: Consultation   Referral Reason: Specialty Services Required   Requested Specialty: Cardiology   Number of Visits Requested: 1       Significant Diagnostic Studies: Labs: All labs within the past 24 hours have  been reviewed    Pending Diagnostic Studies:       None           Medications:  Reconciled Home Medications:      Medication List        STOP taking these medications      aspirin 325 MG EC tablet  Commonly known as: ECOTRIN              Indwelling Lines/Drains at time of discharge:   Lines/Drains/Airways       None                   Time spent on the discharge of patient: 45 minutes         Chadwick Rainey MD  Department of Hospital Medicine  [facility]ces - Internal Medicine Telemetry

## 2024-11-22 NOTE — DISCHARGE INSTRUCTIONS
Follow-Up Appointments:  Schedule a follow-up visit with your Primary Care Provider (PCP) within the next 1-2 weeks to monitor kidney function (creatinine, electrolytes) and overall health.  Follow up with a Cardiologist within 2-4 weeks to discuss findings of heart failure on your echocardiogram and to optimize your heart failure treatment plan.  Lab Monitoring:  Your doctors may request blood work (e.g., kidney function tests and electrolytes) before these visits to ensure that your kidney function and potassium levels are improving.  Medications:  Continue all prescribed medications as instructed. Contact your provider immediately if you experience significant side effects such as dizziness, worsening swelling, or difficulty breathing.  Warning Signs to Watch For:  Call your doctor or seek medical attention if you develop:  Increased shortness of breath or difficulty lying flat.  Significant swelling in your legs, abdomen, or sudden weight gain (>2-3 pounds in 24 hours or 5 pounds in a week).  Reduced urine output or signs of dehydration (dizziness, dry mouth).  Chest pain or pressure.  Lifestyle Recommendations:  Limit salt intake and follow a heart-healthy, kidney-friendly diet as recommended.  Monitor your weight daily and keep a record to discuss with your healthcare providers.  Note: Close follow-up with both your PCP and cardiologist is essential to ensure proper management of your kidney and heart health.

## 2024-11-22 NOTE — PHARMACY MED REC
"Admission Medication History     The home medication history was taken by Brinda Stern.    You may go to "Admission" then "Reconcile Home Medications" tabs to review and/or act upon these items.     The home medication list has been updated by the Pharmacy department.   Please read ALL comments highlighted in yellow.   Please address this information as you see fit.    Feel free to contact us if you have any questions or require assistance.    Medications listed below were obtained from: Patient/family and Analytic software- [employer]  PTA Medications   Medication Sig    aspirin (ECOTRIN) 325 MG EC tablet Take 325 mg by mouth as needed for Pain.     Brinda Stern  EXT EPIC CHAT.          "

## 2024-11-22 NOTE — PLAN OF CARE
Problem: Adult Inpatient Plan of Care  Goal: Optimal Comfort and Wellbeing  Outcome: Progressing  Goal: Readiness for Transition of Care  Outcome: Progressing   Pt sitting up in chair in no apparent distress. AAOx4. Call light in reach.    IV and telemetry box removed, pt discharged.

## 2024-11-22 NOTE — PLAN OF CARE
Nick Garces - Internal Medicine Telemetry  Discharge Final Note    Primary Care Provider: No primary care provider on file.    Expected Discharge Date: 11/22/2024    Final Discharge Note (most recent)       Final Note - 11/22/24 1448          Final Note    Assessment Type Final Discharge Note (P)      Anticipated Discharge Disposition Home or Self Care (P)      What phone number can be called within the next 1-3 days to see how you are doing after discharge? [phone] (P)         Post-Acute Status    Post-Acute Authorization Other (P)      Coverage [facility] - (P)      Other Status Awaiting f/u Appts (P)                    Future Appointments   Date Time Provider Department Center   11/27/2024  2:30 PM Raven Dai, PA-C [facility]  [facility] PCW      Important Message from Medicare             Patient discharged home via personal vehicle.                  RUSTY Bourne, LMSW  [facility]  Case Management  Ext. [phone]

## 2024-11-22 NOTE — HOSPITAL COURSE
54M no significant PMHx here for evaluation of syncopal event in setting of poor PO intake. Patient also with active EtOH use at risk for AUD. In ED patient tachycardic, hyponatremic to 130, YOUNG with Cr 3.0 (unclear baseline), elevated transaminases with TBili 1.3, AST//113, flat troponinemia to peak .113, mild elevation , and CT A/P with c/f perinephric stranding. An elevated d-dimer triggered a V/Q scan which was low probability PE. ECG does raise c/f ischemic changes at V1-V2. An ECHO shows moderately reduced EF with global hypokinesis. Patient with documented hx of combined HF. Cardiology was contacted and did not have strong concern for the global hypokinesis we noted here, with recommendation to initiate GDMT as tolerated. Patient without symptoms and extensive aerobic and anaerobic exercising. Renal function improved with fluid resuscitation. [facility] d[facility], and internal referral.

## 2024-11-22 NOTE — PLAN OF CARE
LINDA received notification from MD that patient requires PCP and cardiology f/u appts. SW phoned [facility] ([facility]). [facility] confirmed [facility] and [facility]. [facility] requesting [facility] worksheet to be submitted to submit request for appts. LINDA completed [facility] worksheet for f/u appts; signed by ESPERANZA. LINDA submitted worksheet to Claire via hard fax to [phone] as requested. [facility] directly to [facility]ts.    No additional post-acute needs.              RUSTY Bourne, LMSW  [facility]  Case Management  Ext. [phone]

## 2024-11-22 NOTE — PLAN OF CARE
Problem: Adult Inpatient Plan of Care  Goal: Plan of Care Review  Outcome: Progressing  Goal: Patient-Specific Goal (Individualized)  Outcome: Progressing  Goal: Absence of Hospital-Acquired Illness or Injury  Outcome: Progressing  Goal: Optimal Comfort and Wellbeing  Outcome: Progressing  Goal: Readiness for Transition of Care  Outcome: Progressing     Pt in room with no s/s of distress. Call bell in reach with bed in low position.

## 2024-11-25 NOTE — PLAN OF CARE
Claire ([facility]) phoned LINDA requesting additional clinicals and dc summary for PCP and cardiology f/u appt. LINDA sent requested clinicals via hard fax to Claire @ [phone].              RUSTY Bourne, LMSW  [facility]  Case Management  Ext. [phone]